# Patient Record
Sex: FEMALE | Race: AMERICAN INDIAN OR ALASKA NATIVE
[De-identification: names, ages, dates, MRNs, and addresses within clinical notes are randomized per-mention and may not be internally consistent; named-entity substitution may affect disease eponyms.]

---

## 2022-08-10 ENCOUNTER — HOSPITAL ENCOUNTER (INPATIENT)
Dept: HOSPITAL 5 - TRG | Age: 37
LOS: 2 days | Discharge: HOME | End: 2022-08-12
Attending: OBSTETRICS & GYNECOLOGY | Admitting: OBSTETRICS & GYNECOLOGY
Payer: MEDICAID

## 2022-08-10 DIAGNOSIS — Z88.8: ICD-10-CM

## 2022-08-10 DIAGNOSIS — J45.909: ICD-10-CM

## 2022-08-10 DIAGNOSIS — Z3A.37: ICD-10-CM

## 2022-08-10 LAB
BAND NEUTROPHILS # (MANUAL): 0.4 K/MM3
HCT VFR BLD CALC: 27.5 % (ref 30.3–42.9)
HCT VFR BLD CALC: 28 % (ref 30.3–42.9)
HGB BLD-MCNC: 8.5 GM/DL (ref 10.1–14.3)
HGB BLD-MCNC: 8.6 GM/DL (ref 10.1–14.3)
MCHC RBC AUTO-ENTMCNC: 31 % (ref 30–34)
MCV RBC AUTO: 85 FL (ref 79–97)
MYELOCYTES # (MANUAL): 0 K/MM3
PLATELET # BLD: 212 K/MM3 (ref 140–440)
PROMYELOCYTES # (MANUAL): 0 K/MM3
RBC # BLD AUTO: 3.28 M/MM3 (ref 3.65–5.03)
TOTAL CELLS COUNTED BLD: 100

## 2022-08-10 PROCEDURE — 86706 HEP B SURFACE ANTIBODY: CPT

## 2022-08-10 PROCEDURE — 86900 BLOOD TYPING SEROLOGIC ABO: CPT

## 2022-08-10 PROCEDURE — 86901 BLOOD TYPING SEROLOGIC RH(D): CPT

## 2022-08-10 PROCEDURE — 36415 COLL VENOUS BLD VENIPUNCTURE: CPT

## 2022-08-10 PROCEDURE — 85027 COMPLETE CBC AUTOMATED: CPT

## 2022-08-10 PROCEDURE — 85007 BL SMEAR W/DIFF WBC COUNT: CPT

## 2022-08-10 PROCEDURE — 86592 SYPHILIS TEST NON-TREP QUAL: CPT

## 2022-08-10 PROCEDURE — 86850 RBC ANTIBODY SCREEN: CPT

## 2022-08-10 PROCEDURE — 85014 HEMATOCRIT: CPT

## 2022-08-10 PROCEDURE — 87806 HIV AG W/HIV1&2 ANTB W/OPTIC: CPT

## 2022-08-10 PROCEDURE — 85025 COMPLETE CBC W/AUTO DIFF WBC: CPT

## 2022-08-10 PROCEDURE — 85018 HEMOGLOBIN: CPT

## 2022-08-10 PROCEDURE — 86762 RUBELLA ANTIBODY: CPT

## 2022-08-10 PROCEDURE — 88307 TISSUE EXAM BY PATHOLOGIST: CPT

## 2022-08-10 PROCEDURE — 80307 DRUG TEST PRSMV CHEM ANLYZR: CPT

## 2022-08-10 RX ADMIN — OXYCODONE AND ACETAMINOPHEN PRN TAB: 5; 325 TABLET ORAL at 18:11

## 2022-08-10 RX ADMIN — IBUPROFEN SCH MG: 800 TABLET, FILM COATED ORAL at 23:36

## 2022-08-10 RX ADMIN — IBUPROFEN SCH MG: 800 TABLET, FILM COATED ORAL at 17:17

## 2022-08-10 NOTE — HISTORY AND PHYSICAL REPORT
History of Present Illness


Date of examination: 08/10/22


Chief complaint: 


ctx and LOF


History of present illness: 


 at 37.1wks by LMP c/w U/S at [facility].  Pt left this practice and 

transferred to Dr. Sewell.  Prenatals from [facility] obtained up to 24wks 

when pt was told that she had amniotic band and pt left to another practice.  Pt

admits to fetal movement, feeling painful ctx, LOF at [facility] clinic today approx 

10am, denies headache, fever or chills, denies vag bleed. Records from [facility] show Blood type ) positive, neg screen, rubella immune, VDRL neg, urine 

culture negative, HepBsAg neg and HIV negative.  U/S at 16wks with amniotic band

vs synechiaea and c-spine abnormality.  No 1hrgtt done and repeat u/s at 20wks 

remain with amniotic bands.  Pt states she went to [facility] but no records available.





Past History


Past Medical History: asthma


Past Surgical History: no surgical history


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





- Obstetrical History


Expected Date of Delivery: 22


Actual Gestation: 37 Week(s) 1 Day(s) 


: 4


Hx # Term Pregnancies: 2


Spontaneous Abortions: 1


Number of Living Children: 2





Medications and Allergies


                                    Allergies











Allergy/AdvReac Type Severity Reaction Status Date / Time


 


metronidazole [From Flagyl] Allergy  Swelling Verified 10/24/13 09:02


 


Metronidazole HCl Allergy  Swelling Verified 10/24/13 09:02





[From Flagyl]     











                                Home Medications











 Medication  Instructions  Recorded  Confirmed  Last Taken  Type


 


Albuterol Mdi (or & Nicu Only) 2 puff IH QID PRN 10/24/13 10/24/13 Unknown 

History





[Proair]     











Active Meds: 


Active Medications





Acetaminophen (Acetaminophen 325 Mg Tab)  650 mg PO Q4H PRN


   PRN Reason: Pain, Mild (1-3)


Carboprost Tromethamine (Carboprost Tromethamine 250 Mcg/1 Ml Inj)  250 mcg IM 

ONCE PRN


   PRN Reason: Uterine Bleeding


Ephedrine Sulfate (Ephedrine Sulfate 50 Mg/1 Ml Inj)  10 mg IV Q2M PRN


   PRN Reason: Hypotension


Fentanyl (Fentanyl 100 Mcg/2 Ml Inj)  100 mcg IV Q2H PRN


   PRN Reason: Pain,Severe (7-10) LABOR PAIN


Oxytocin/Sodium Chloride (Pitocin/Ns 30 Unit/500ml)  30 units in 500 mls @ 2 

mls/hr IV TITR GAVINO; Protocol


Lactated Ringer's (Lactated Ringers)  1,000 mls @ 125 mls/hr IV AS DIRECT GAVINO


Oxytocin/Sodium Chloride (Pitocin/Ns 30 Unit/500ml)  30 units in 500 mls @ 40 

mls/hr IV TITR GAVINO; Protocol


Penicillin G Potassium 5 mil. (units/ Sodium Chloride)  50 mls @ 100 mls/hr IV 

ONCE ONE; Protocol


   Stop: 08/10/22 13:09


Lidocaine (Lidocaine (2%) 20 Mg/1 Ml Vial 20 Ml Mdv)  20 ml INFILTRATI ONCE ONE


   Stop: 08/10/22 12:41


Loperamide HCl (Loperamide 2 Mg Cap)  2 mg PO ONCE PRN


   PRN Reason: give with Hemabate


Methylergonovine Maleate (Methylergonovine Maleate 0.2 Mg/Ml Vial)  0.2 mg IM 

ONCE PRN


   PRN Reason: Uterine Bleeding


Mineral Oil (Mineral Oil 30 Ml Oral Liqd)  30 ml PO QHS PRN


   PRN Reason: Constipation


Misoprostol (Misoprostol 200 Mcg Tab)  800 mcg ME ONCE PRN


   PRN Reason: Uterine Bleeding


Nalbuphine HCl (Nalbuphine 10 Mg/1 Ml Inj)  10 mg IV Q2H PRN


   PRN Reason: Pain, Moderate (4-6)


Ondansetron HCl (Ondansetron 4 Mg/2 Ml Inj)  4 mg IV Q8H PRN


   PRN Reason: Nausea And Vomiting


Oxytocin (Oxytocin 10 Unit/1 Ml Inj)  10 unit IM ONCE PRN


   PRN Reason: Uterine Bleeding


Promethazine HCl (Promethazine 25 Mg Tab)  25 mg PO Q6H PRN


   PRN Reason: Nausea And Vomiting


Terbutaline Sulfate (Terbutaline 1 Mg/1 Ml Inj)  0.25 mg SUB-Q ONCE PRN


   PRN Reason: Hyperstimulation/Hypertonicity











Review of Systems


All systems: negative (pt screaming to get the baby out of her and pushing from 

5cm dilation)





- Vital Signs


Vital signs: 


                                   Vital Signs











Pulse Pulse Ox


 


 72   100 


 


 08/10/22 11:44  08/10/22 11:44








                                        











Temp Pulse Resp BP Pulse Ox


 


    78         100 


 


    08/10/22 12:42        08/10/22 12:42














- Physical Exam


Breasts: Positive: deferred


Cardiovascular: Regular rate


Lungs: Positive: Normal air movement


Abdomen: Positive: soft


Genitourinary (Female): Positive: normal external genitalia


Vulva: both: normal (no lesions seen)


Vagina: Positive: normal moisture


Uterus: Positive: enlarged (non-tender gravid)





- Obstetrical


FHR: category 1


Uterine Contraction Monitor Mode: External


Cervical Dilatation: 10 (5cm in triage 1hr prior)


Cervical Effacement Percentage: 100


Fetal station: +3


Uterine Contraction Pattern: Regular


Uterine Contraction Intensity: Moderate





Results


Result Diagrams: 


                                 08/10/22 12:30





All other labs normal.








Assessment and Plan


Term laboring patient in active labor, SROM and ready to push baby


1. Admit to labor and delivery.  See delivery note


2. Prenatal records obtained after the delivery was done


3. All questions encouraged and answered

## 2022-08-10 NOTE — PROCEDURE NOTE
OB Delivery Note





- Delivery


Date of Delivery: 08/10/22


Surgeon: FORTINO RAMESH


Estimated blood loss: 200cc





- Vaginal


Delivery presentation: vertex


Delivery position: OA (with flexed right arm and fingers immediately below chin)


Intrapartum events: precipitous labor- <3hr


Delivery induction: none


Delivery monitor: external FHT, external uterine


Route of delivery: 


Delivery placenta: spontaneous


Delivery cord: 3 umbilical vessels


Episiotomy: none


Delivery laceration: none


Anesthesia: none


Delivery comments: 


Precipitous vag delivery of viable female infant and pt screaming and running up

the bed.  Placenta delivered eventually and has accessory lobe, and same sent to

pathology.  Cervix visualized anteriorly without lacerations, pt could not to

lerate exam therefore manual exam done and same appears complete.  Bimanual exam

done and clots removed from lower uterine segment.  Pt given IV pain med just 

prior to delivery.  Baby and patient doing well.  [place] nurse present for 

delivery.





- Infant


  ** A


Apgar at 1 minute: 8


Apgar at 5 minutes: 9 (wt 2820g)


Infant Gender: Female

## 2022-08-11 LAB
BASOPHILS # (AUTO): 0 K/MM3 (ref 0–0.1)
BASOPHILS NFR BLD AUTO: 0.3 % (ref 0–1.8)
EOSINOPHIL # BLD AUTO: 0 K/MM3 (ref 0–0.4)
EOSINOPHIL NFR BLD AUTO: 0.4 % (ref 0–4.3)
HCT VFR BLD CALC: 32.2 % (ref 30.3–42.9)
HCT VFR BLD CALC: 33.6 % (ref 30.3–42.9)
HGB BLD-MCNC: 11.5 GM/DL (ref 10.1–14.3)
HGB BLD-MCNC: 11.7 GM/DL (ref 10.1–14.3)
LYMPHOCYTES # BLD AUTO: 1.9 K/MM3 (ref 1.2–5.4)
LYMPHOCYTES NFR BLD AUTO: 23.5 % (ref 13.4–35)
MCHC RBC AUTO-ENTMCNC: 35 % (ref 30–34)
MCHC RBC AUTO-ENTMCNC: 36 % (ref 30–34)
MCV RBC AUTO: 97 FL (ref 79–97)
MCV RBC AUTO: 99 FL (ref 79–97)
MONOCYTES # (AUTO): 0.6 K/MM3 (ref 0–0.8)
MONOCYTES % (AUTO): 7 % (ref 0–7.3)
PLATELET # BLD: 113 K/MM3 (ref 140–440)
PLATELET # BLD: 115 K/MM3 (ref 140–440)
RBC # BLD AUTO: 3.33 M/MM3 (ref 3.65–5.03)
RBC # BLD AUTO: 3.41 M/MM3 (ref 3.65–5.03)

## 2022-08-11 RX ADMIN — IBUPROFEN SCH: 800 TABLET, FILM COATED ORAL at 06:34

## 2022-08-11 RX ADMIN — Medication SCH EACH: at 10:32

## 2022-08-11 RX ADMIN — IBUPROFEN SCH: 800 TABLET, FILM COATED ORAL at 00:00

## 2022-08-11 RX ADMIN — OXYCODONE AND ACETAMINOPHEN PRN TAB: 5; 325 TABLET ORAL at 18:30

## 2022-08-11 RX ADMIN — OXYCODONE AND ACETAMINOPHEN PRN TAB: 5; 325 TABLET ORAL at 10:32

## 2022-08-11 NOTE — PROGRESS NOTE
Assessment and Plan


A: PP Day #1


    Asymptomatic Anemia


    (Hgb increased from 8.6 to 11.5 after delivery)





P: Follow Routine Postpartum Orders


    Repeat CBC








Subjective





- Subjective


Date of service: 22


Patient reports: appetite normal, voiding normally, pain well controlled, 

flatus, bowel movement, ambulating normally


: doing well, bottle feeding





Objective





- Vital Signs


Latest vital signs: 


                                   Vital Signs











  Temp Pulse Resp BP BP Pulse Ox Pulse Ox


 


 22 12:51  97.6 F  69  20  117/77   97 


 


 22 08:00        98


 


 22 07:58  98.0 F  70  20  109/54   97 


 


 08/10/22 23:12  98.2 F  73  20  103/71   99 


 


 08/10/22 19:45        99


 


 08/10/22 16:15  98.0 F  65  18   106/73  100  100


 


 08/10/22 15:32   67     100 


 


 08/10/22 15:30   84     93 


 


 08/10/22 15:27   82     97 


 


 08/10/22 15:24   72     91 


 


 08/10/22 15:22   85     100 


 


 08/10/22 14:52   83     97 


 


 08/10/22 14:47   81     100 


 


 08/10/22 14:42   77     97 








                                Intake and Output











 08/10/22 08/11/22 08/11/22





 22:59 06:59 14:59


 


Intake Total 120  200


 


Output Total 100  


 


Balance 20  200


 


Intake:   


 


  Oral 120  200


 


Output:   


 


  Urine 100  


 


    Void 100  


 


Other:   


 


  Total, Intake Amount 120  200


 


  Total, Output Amount 100  


 


  # Voids   


 


    Void 1  1


 


  # Bowel Movements   1














- Exam


Breasts: Present: normal


Cardiovascular: Present: Regular rate


Lungs: Present: Clear to auscultation, Normal air movement


Abdomen: Present: normal appearance, soft, normal bowel sounds


Uterus: Present: normal, firm, fundal height below umbilicus


Extremities: Present: normal





- Labs


Labs: 


                              Abnormal lab results











  08/10/22 08/11/22 Range/Units





  16:00 07:57 


 


WBC  20.7 H   (4.5-11.0)  K/mm3


 


RBC  3.28 L  3.33 L  (3.65-5.03)  M/mm3


 


Hgb  8.5 L   (10.1-14.3)  gm/dl


 


Hct  28.0 L   (30.3-42.9)  %


 


MCH  26 L  34 H  (28-32)  pg


 


MCHC   36 H  (30-34)  %


 


RDW  16.2 H   (13.2-15.2)  %


 


Plt Count   115 L  (140-440)  K/mm3


 


Seg Neuts % (Manual)  78.0 H   (40.0-70.0)  %


 


Seg Neutrophils # Man  16.1 H   (1.8-7.7)  K/mm3

## 2022-08-12 VITALS — DIASTOLIC BLOOD PRESSURE: 81 MMHG | SYSTOLIC BLOOD PRESSURE: 119 MMHG

## 2022-08-12 LAB
BASOPHILS # (AUTO): 0 K/MM3 (ref 0–0.1)
BASOPHILS NFR BLD AUTO: 0.7 % (ref 0–1.8)
EOSINOPHIL # BLD AUTO: 0.1 K/MM3 (ref 0–0.4)
EOSINOPHIL NFR BLD AUTO: 1.1 % (ref 0–4.3)
HCT VFR BLD CALC: 37.3 % (ref 30.3–42.9)
HGB BLD-MCNC: 12.9 GM/DL (ref 10.1–14.3)
LYMPHOCYTES # BLD AUTO: 2 K/MM3 (ref 1.2–5.4)
LYMPHOCYTES NFR BLD AUTO: 28.4 % (ref 13.4–35)
MCHC RBC AUTO-ENTMCNC: 35 % (ref 30–34)
MCV RBC AUTO: 98 FL (ref 79–97)
MONOCYTES # (AUTO): 0.5 K/MM3 (ref 0–0.8)
MONOCYTES % (AUTO): 7.1 % (ref 0–7.3)
PLATELET # BLD: 128 K/MM3 (ref 140–440)
RBC # BLD AUTO: 3.81 M/MM3 (ref 3.65–5.03)

## 2022-08-12 RX ADMIN — OXYCODONE AND ACETAMINOPHEN PRN TAB: 5; 325 TABLET ORAL at 09:12

## 2022-08-12 RX ADMIN — IBUPROFEN SCH MG: 800 TABLET, FILM COATED ORAL at 13:13

## 2022-08-12 RX ADMIN — IBUPROFEN SCH MG: 800 TABLET, FILM COATED ORAL at 00:00

## 2022-08-12 RX ADMIN — IBUPROFEN SCH MG: 800 TABLET, FILM COATED ORAL at 06:32

## 2022-08-12 RX ADMIN — Medication SCH EACH: at 09:12

## 2022-08-12 RX ADMIN — OXYCODONE AND ACETAMINOPHEN PRN TAB: 5; 325 TABLET ORAL at 02:14

## 2022-08-12 NOTE — PROGRESS NOTE
Assessment and Plan


PPD#2  doing well


1. pt counseled that motrin script will be given for pain relief and she may add

tylenol as well.   However no narcotics will be given and abnormal pap is not a 

requirement 


2. Pt to follow up in office for contraception at 6wks.


All questions encouraged and answered





Subjective


Date of service: 22


Principal diagnosis: PPD#2 


Interval history: 


pt desires percocet and motrin on discharge.  pt is breast feeding.  pt states 

that she has abnormal papsmear prior to delivery and will need narcotics for 

pain relief.  pt is bottle feeding.  pt voids without difficulty.  Vag bleed 

less than a period





Objective





- Constitutional


Vitals: 


                               Vital Signs - 12hr











  22





  01:24 08:13 08:38


 


Temperature 97.7 F 98.0 F 


 


Pulse Rate 69 73 


 


Respiratory 18 18 





Rate   


 


Blood Pressure 115/68 118/85 


 


O2 Sat by Pulse 100 100 





Oximetry   


 


O2 Sat by Pulse   98





Oximetry [   





Bilateral   





Throughout]   











General appearance: Present: no acute distress





- Neck


Neck: normal ROM





- Respiratory


Respiratory effort: normal





- Breasts


Breasts: deferred





- Cardiovascular


Rhythm: regular


Extremities: No edema





- Gastrointestinal


General gastrointestinal: Present: soft, non-tender





- Genitourinary


Female genitourinary: other (fundus firm and non-tender, 2cm below the 

umbilicus; lochia small)





- Neurologic


Neurologic: moves all extremities





- Psychiatric


Psychiatric: cooperative





- Labs


CBC & Chem 7: 


                                 22 09:45





Labs: 


                              Abnormal lab results











  22 Range/Units





  16:02 09:45 


 


RBC  3.41 L   (3.65-5.03)  M/mm3


 


MCV  99 H  98 H  (79-97)  fl


 


MCH  34 H  34 H  (28-32)  pg


 


MCHC  35 H  35 H  (30-34)  %


 


Plt Count  113 L  128 L  (140-440)  K/mm3














Medications & Allergies





- Medications


Allergies/Adverse Reactions: 


                                    Allergies





metronidazole [From Flagyl] Allergy (Verified 10/24/13 09:02)


   Swelling


Metronidazole HCl [From Flagyl] Allergy (Verified 10/24/13 09:02)


   Swelling








Home Medications: 


                                Home Medications











 Medication  Instructions  Recorded  Confirmed  Last Taken  Type


 


Albuterol Mdi (or & Nicu Only) 2 puff IH QID PRN 10/24/13 10/24/13 Unknown 

History





[Proair]     











Active Medications: 














Generic Name Dose Route Start Last Admin





  Trade Name Freq  PRN Reason Stop Dose Admin


 


Acetaminophen  650 mg  08/10/22 17:00 





  Acetaminophen 325 Mg Tab  PO  





  Q4H PRN  





  Pain MILD(1-3)/Fever >100.5/HA  


 


Benzocaine/Menthol  1 spray  08/10/22 17:00  22 10:32





  Benzocaine/Menthol 20/0.5% Top Spray 56 Gm  TP   1 spray





  PRN PRN   Administration





  Episiotomy Pain  


 


Bisacodyl  10 mg  08/10/22 17:00 





  Bisacodyl 10 Mg Rect Supp  RI  





  BID PRN  





  Constipation  


 


Carboprost Tromethamine  250 mcg  08/10/22 12:40 





  Carboprost Tromethamine 250 Mcg/1 Ml Inj  IM  





  ONCE PRN  





  Uterine Bleeding  


 


Diphenhydramine HCl  25 mg  08/10/22 17:00 





  Diphenhydramine 25 Mg Cap  PO  





  Q6H PRN  





  Itching  


 


Hydrocortisone Acetate  25 mg  08/10/22 17:00 





  Hydrocortisone 25 Mg Rectal Supp  RI  





  BID PRN  





  Hemorrhoids  


 


Lactated Ringer's  1,000 mls @ 125 mls/hr  08/10/22 12:45 





  Lactated Ringers  IV  





  AS DIRECT GAVINO  


 


Oxytocin/Sodium Chloride  30 units in 500 mls @ 40 mls/hr  08/10/22 17:00 





  Pitocin/Ns 30 Unit/500ml  IV  





  TITR GAVINO  





  Protocol  


 


Ibuprofen  800 mg  08/10/22 18:00  22 06:32





  Ibuprofen 800 Mg Tab  PO   800 mg





  Q6H GAVINO   Administration


 


Loperamide HCl  2 mg  08/10/22 12:40 





  Loperamide 2 Mg Cap  PO  





  ONCE PRN  





  give with Hemabate  


 


Magnesium Hydroxide  30 ml  08/10/22 22:00 





  Magnesium Hydroxide (Mom) Oral Liqd Udc  PO  





  HS PRN  





  Constipation  


 


Methylergonovine Maleate  0.2 mg  08/10/22 12:40 





  Methylergonovine Maleate 0.2 Mg/Ml Vial  IM  





  ONCE PRN  





  Uterine Bleeding  


 


Misoprostol  800 mcg  08/10/22 12:40 





  Misoprostol 200 Mcg Tab  RI  





  ONCE PRN  





  Uterine Bleeding  


 


Multi-Ingredient Ointment  1 applic  08/10/22 17:00 





  Lanolin/Zinc/Dimethicone (Lansinoh) 7 Gm  TP  





  PRN PRN  





  Sore Nipples  


 


Multivitamins/Iron/Calcium  1 each  22 10:00  22 09:12





  Prenatal [id number] Fumarate-Folic Acid Vit Tab  PO   1 each





  QDAY GAVINO   Administration


 


Ondansetron HCl  4 mg  08/10/22 17:30 





  Ondansetron 4 Mg/2 Ml Inj  IV  





  Q8H PRN  





  Nausea And Vomiting  


 


Oxycodone/Acetaminophen  2 tab  08/10/22 17:00  22 09:12





  Oxycodone /Acetaminophen 5-325mg Tab  PO   2 tab





  Q4H PRN   Administration





  Pain, Moderate (4-6)  


 


Oxytocin  10 unit  08/10/22 12:40 





  Oxytocin 10 Unit/1 Ml Inj  IM  





  ONCE PRN  





  Uterine Bleeding  


 


Promethazine HCl  25 mg  08/10/22 17:30 





  Promethazine 25 Mg Rect Supp  RI  





  Q6H PRN  





  Nausea And Vomiting  


 


Promethazine HCl  25 mg  08/10/22 17:30 





  Promethazine 25 Mg Tab  PO  





  Q6H PRN  





  Nausea And Vomiting  


 


Sodium Chloride  10 ml  08/10/22 18:00 





  Sodium Chloride 0.9% 10 Ml Flush Syringe  IV  





  PRN PRN  





  flush  


 


Witch Hazel/Glycerin  1 each  08/10/22 17:00 





  Witch Hazel/ Glycerin Pad  TP  





  PRN PRN  





  Hemorrhoid/cleansing/soothing

## 2022-08-12 NOTE — DISCHARGE SUMMARY
Providers





- Providers


Date of Admission: 


08/10/22 12:40





Date of discharge: 22


Attending physician: 


FORTINO RAMESH





Primary care physician: 


FORTINO RAMESH








Hospitalization


Reason for admission: IUP at term


Delivery: 


Episiotomy: none


Laceration: none


Postpartum complications: none


Discharge diagnosis: IUP at term delivered


[place] baby: female


Hospital course: 


Term preg in labor, had uncomplicated  and postpartum uneventful.  Pt was not

given narcotics as she desired with no lacerations or complications that merited

such.  She was given motrin script.


Condition at discharge: Good


Disposition: 01 HOME / SELF CARE / HOMELESS





Plan





- Provider Discharge Summary


Activity: no sex for 6 weeks


Diet: routine


Instructions: other (NO sex.  please make 6wk appt with your provider.  call if 

temp>100.3, severe pelvic pain or heavy vag bleed with large clots)


Additional instructions: 


[]  Smoking cessation referral if applicable(refer to patient education folder 

for contact #)


[]  Refer to [facility] Booklet








Call your doctor immediately for:


* Fever > 100.5


* Heavy vaginal bleeding ( >1 pad per hour)


* Severe persistent headache


* Shortness of breath


* Reddened, hot, painful area to leg or breast


* Drainage or odor from incision.





* Keep incision clean and dry at all times and follow doctor's instructions 

regarding bathing/showering











- Follow up plan


Follow up: 


FORTINO RAMESH MD [Primary Care Provider] - 6 Weeks